# Patient Record
Sex: MALE | Race: WHITE
[De-identification: names, ages, dates, MRNs, and addresses within clinical notes are randomized per-mention and may not be internally consistent; named-entity substitution may affect disease eponyms.]

---

## 2017-06-10 NOTE — EDM.PDOC
ED HPI GENERAL MEDICAL PROBLEM





- General


Chief Complaint: Lower Extremity Injury/Pain


Stated Complaint: RIGHT KNEE INJURY


Time Seen by Provider: 06/10/17 01:17


Source of Information: Reports: Patient


History Limitations: Reports: No Limitations





- History of Present Illness


INITIAL COMMENTS - FREE TEXT/NARRATIVE: 





26-year-old male presents the ED with painful swollen right knee. No history of 

any trauma to the knee. He was on the golf course good portion of the day 

engulfed about 25 holes. Lots of walking up and down hills but no falls or 

direct blow to the knee reported. States at present the knees continued to 

swell up over the last 4-5 hours and he is no longer able to weight-bear. 

Reports a pre-existing clicking in the knee periodically for the last 10 years. 

It is never swollen up like this before. Constant throbbing pain.


Onset: Gradual


Onset Date: 06/09/17 (noted some discomfort in the knee last evening while 

golfing.)


Onset Time: 20:00


Duration: Hour(s):, Getting Worse


Location: Reports: Lower Extremity, Right (right knee.)


Quality: Reports: Ache, Pressure, Throbbing


Severity: Moderate


Improves with: Reports: None, Rest


Worsens with: Reports: Movement


Context: Denies: Activity, Exercise, Lifting, Sick Contact, Trauma, Other


Associated Symptoms: Denies: Diaphoresis, Fever/Chills, Headaches, Loss of 

Appetite, Malaise, Nausea/Vomiting, Rash, Seizure, Syncope, Weakness


Treatments PTA: Reports: Acetaminophen, NSAIDS (Motrin with no relief)


  ** Right Knee


Pain Score (Numeric/FACES): 4





- Related Data


 Allergies











Allergy/AdvReac Type Severity Reaction Status Date / Time


 


No Known Allergies Allergy   Verified 06/10/17 01:13











Home Meds: 


 Home Meds





Diclofenac Sodium [Voltaren] 50 mg PO TIDMEALS #24 tab.ec 06/10/17 [Rx]


oxyCODONE HCl/Acetaminophen [Percocet 5-325 mg Tablet] 1 - 2 each PO Q4H PRN #

20 tablet 06/10/17 [Rx]











Social & Family History





- Living Situation & Occupation


Living situation: Reports: Single


Occupation: Employed





Review of Systems





- Review of Systems


Review Of Systems: See Below


Constitutional: Reports: No Symptoms


Eyes: Reports: No Symptoms


Ears: Reports: No Symptoms


Nose: Reports: No Symptoms


Mouth/Throat: Reports: No Symptoms


Respiratory: Reports: No Symptoms


Cardiovascular: Reports: No Symptoms


GI/Abdominal: Reports: No Symptoms


Genitourinary: Reports: No Symptoms


Musculoskeletal: Reports: Joint Pain


Skin: Reports: No Symptoms


Neurological: Reports: No Symptoms


Psychiatric: Reports: No Symptoms





ED EXAM, GENERAL





- Physical Exam


Exam: See Below


Exam Limited By: No Limitations


General Appearance: Alert, WD/WN, No Apparent Distress, Other (patient admits 

to drinking beer good portion of all day yesterday. Last there was an hour ago. 

He is mildly intoxicated.)


Extremities: Other (examination is limited to his right knee. Obvious swelling 

of the patella the prepatellar tissue. There is no true knee effusion however. 

No pain along the lateral or medial joint space. It is exquisitely tender to 

touch and very warm to palpation over the kneecap. Can't tell exact platelets 

are prepatellar bursitis although I suspect it is mostly inflammation from the 

true knee joint under the patella.)


Neurological: Alert, Oriented, CN II-XII Intact


Psychiatric: Normal Affect, Normal Mood


Skin Exam: Warm, Dry, Intact, Normal Color, No Rash





ED JOINT ASPIRATION PROCEDURE





- Joint Apsiration/Arthrocentesis


Skin prep: Chlorhexidine (Hibiciens)


Local anesthesia: Lidocaine: 1% Plain


Local Anesthetic Volume: 4cc


Aspiration needle size: 18g


Aspirate appearance: serous


Aspirate amount in cc's: 12


Joint injection: other and amount: (Kenalog 60 mg)





Course





- Vital Signs


Last Recorded V/S: 


 Last Vital Signs











Temp  36.6 C   06/10/17 01:07


 


Pulse  84   06/10/17 01:07


 


Resp  20   06/10/17 01:07


 


BP  130/71   06/10/17 01:07


 


Pulse Ox  100   06/10/17 01:07














- Orders/Labs/Meds


Meds: 


Medications














Discontinued Medications














Generic Name Dose Route Start Last Admin





  Trade Name Jhonatan  PRN Reason Stop Dose Admin


 


Ondansetron HCl  4 mg  06/10/17 01:27  06/10/17 01:40





  Zofran Odt  PO  06/10/17 01:28  4 mg





  ONETIME ONE   Administration


 


Oxycodone/Acetaminophen  2 tab  06/10/17 01:26  06/10/17 01:40





  Percocet 325-5 Mg  PO  06/10/17 01:27  2 tab





  ONETIME ONE   Administration


 


Triamcinolone Acetonide  80 mg  06/10/17 01:42  06/10/17 01:49





  Kenalog-40  INJECT  06/10/17 01:43  60 mg





  ONETIME ONE   Administration














- Radiology Interpretation


Free Text/Narrative:: 





26-year-old male presents the ED with painful swollen right knee. No specific 

injury could be identified. He was coughing almost all day yesterday golfed 

about 26 holes walking a lot of pills [place]. States that he started to 

become somewhat painful last evening around 2000 hours and is progressively 

increased in size with and pain and swelling. Examination shows the swelling is 

all around the patella. There is no Rt. knee joint effusion released the 

prepatellar pouch does not appear to be pleural fluid. There is no fluid in the 

posterior aspect of the knee either. Plan x-ray of the knee will be done. Based 

on this a decision made whether to aspirate the area to reduce his pain and the 

swelling. Given 2 Percocets 5-25 mg orally as well Zofran 4 mg sublingual.





- Re-Assessments/Exams


Free Text/Narrative Re-Assessment/Exam: 





06/10/17 01:41 x-ray of the knee shows marked prepatellar bursitis. There is a 

little fluid under the patella at but otherwise patella is intact and in 

appropriate position. Plan is to aspirate the prepatellar bursa under local 

anesthetic and instilled 60 mg of Kenalog. Consent for the procedure will be 

obtained from the patient.





06/10/17 01:41 aspirin only 12 mils of serous material from the prepatellar 

bursa area. 60 mg of Kenalog was instilled into the area. Patient will be 

placed in the knee immobilizer be non weightbearing crutch walking until the 

swelling settles down. Followup if not markedly improved in 3-4 days' time. Did 

send him home with 5 Percocet 5 /325 mg tablets one or 2 of 4-6 hours needed 

for pain relie








Departure





- Departure


Time of Disposition: 02:03


Disposition: Home, Self-Care 01


Condition: fair


Clinical Impression: 


Bursitis


Qualifiers:


 Bursitis location: knee Knee bursitis location: prepatellar bursitis Laterality

: right Qualified Code(s): M70.41 - Prepatellar bursitis, right knee








- Discharge Information


Prescriptions: 


Diclofenac Sodium [Voltaren] 50 mg PO TIDMEALS #24 tab.ec


oxyCODONE HCl/Acetaminophen [Percocet 5-325 mg Tablet] 1 - 2 each PO Q4H PRN #

20 tablet


 PRN Reason: pain relief. 


Instructions:  Bursitis


Referrals: 


PCP,None [Primary Care Provider] - 


Forms:  ED Department Discharge, Return to Work/School Form


Additional Instructions: 


Evaluation in the emergency room this morning in regards to marked swelling of 

the right knee around the kneecap. The areas are very warm to palpate pain 

palpation suggesting underlying inflammatory process. No known trauma. It doesn'

t involve a lot of kneeling. X-ray of the knee was carried out and revealed the 

patella to be in its normal anatomical position and the fluid was in front of 

the kneecap indicating prepatellar bursitis. Aspirated 12 mils of fluid from 

the prepatellar bursa and instilled 60 mg of Kenalog into the bursa to try and 

reduce the swelling and inflammation. Likely cause is pseudogout or gout 

crystals. Medication will need to be Voltaren 50 mg 3 times daily for the next 

8 days to reduce inflammation. Percocet tablets 5-25 one or 2 of 4-6 hours for 

pain relief as needed for the next couple of days. Suggest knee immobilizer on 

during the day and off at night for the next 3 days until the swelling goes 

down and he can walk without need for crutches. Ice pack to the area still 

worthwhile for one half hour out of every 4 hours tomorrow.

## 2017-06-12 NOTE — CR
Right knee: AP, lateral and sunrise patellar views of the right knee 

were obtained.

 

Comparison: No previous study.

 

Soft tissue swelling is seen within the anterior knee.  Medial and 

lateral joint spaces are maintained in height.  No joint effusion is 

seen.  No fracture or other abnormality is seen.

 

Impression:

1.  Soft tissue swelling within the anterior knee.

2.  No bony abnormality is identified on right knee exam.

 

Diagnostic code #2

## 2021-02-18 ENCOUNTER — HOSPITAL ENCOUNTER (EMERGENCY)
Dept: HOSPITAL 41 - JD.ED | Age: 31
Discharge: HOME | End: 2021-02-18
Payer: COMMERCIAL

## 2021-02-18 VITALS — HEART RATE: 84 BPM | DIASTOLIC BLOOD PRESSURE: 113 MMHG | SYSTOLIC BLOOD PRESSURE: 133 MMHG

## 2021-02-18 DIAGNOSIS — Z72.0: ICD-10-CM
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
## 2021-02-18 DIAGNOSIS — S61.211A: Primary | ICD-10-CM

## 2021-02-18 NOTE — EDM.PDOC
ED HPI GENERAL MEDICAL PROBLEM





- General


Chief Complaint: Upper Extremity Injury/Pain


Stated Complaint: LEFT HAND FINGER LAC


Time Seen by Provider: 02/18/21 13:10


Source of Information: Reports: Patient


History Limitations: Reports: No Limitations





- History of Present Illness


INITIAL COMMENTS - FREE TEXT/NARRATIVE: 





30-year-old male presents the emergency department complaints of left hand 

injury.  He states he got his hand caught between the front of a flatbed truck 

in the back of the cab.  This injury occurred just prior to arrival.


  ** Left Finger-Index


Pain Score (Numeric/FACES): 5





- Related Data


                                    Allergies











Allergy/AdvReac Type Severity Reaction Status Date / Time


 


No Known Allergies Allergy   Verified 02/18/21 12:25














Past Medical History





- Past Health History


Medical/Surgical History: Denies Medical/Surgical History


Cardiovascular History: Reports: None


Respiratory History: Reports: None


Gastrointestinal History: Reports: None


Genitourinary History: Reports: None


Musculoskeletal History: Reports: None


Neurological History: Reports: None


Psychiatric History: Reports: None


Endocrine/Metabolic History: Reports: None


Hematologic History: Reports: None


Immunologic History: Reports: None


Oncologic (Cancer) History: Reports: None


Dermatologic History: Reports: None





- Infectious Disease History


Infectious Disease History: Reports: None





- Past Surgical History


Head Surgeries/Procedures: Reports: None


HEENT Surgical History: Reports: Tonsillectomy


Cardiovascular Surgical History: Reports: None


Endocrine Surgical History: Reports: None





Social & Family History





- Family History


Family Medical History: No Pertinent Family History





- Tobacco Use


Years of Tobacco use: 12





- Caffeine Use


Caffeine Use: Reports: Coffee





- Recreational Drug Use


Recreational Drug Use: No





- Living Situation & Occupation


Living situation: Reports: Single


Occupation: Employed





Review of Systems





- Review of Systems


Review Of Systems: Comprehensive ROS is negative, except as noted in HPI.





ED EXAM, GENERAL





- Physical Exam


Exam: See Below


Exam Limited By: No Limitations


General Appearance: Alert, WD/WN, No Apparent Distress


Eye Exam: Bilateral Eye: PERRL


Ears: Hearing Grossly Normal


Nose: Normal Inspection


Throat/Mouth: Normal Voice, No Airway Compromise


Head: Atraumatic, Normocephalic


Neck: Normal Inspection, Supple, Non-Tender, Full Range of Motion


Respiratory/Chest: No Respiratory Distress, Lungs Clear, Normal Breath Sounds, 

No Accessory Muscle Use, Chest Non-Tender


Cardiovascular: Normal Peripheral Pulses, Regular Rate, Rhythm, No Edema, No 

Murmur


Peripheral Pulses: 2+: Radial (L), Radial (R)


Extremities: Normal Inspection, Normal Range of Motion, No Pedal Edema


Neurological: Alert, Oriented, Normal Cognition


Skin Exam: Warm, Dry, Normal Color, Wound/Incision (1 cm laceration noted at the

base of the left index finger on the lateral side between the index and middle 

finger.)


Lymphatic: No Adenopathy





ED TRAUMA EXTREMITY PROCEDURES





- Laceration/Wound Repair


  ** Left Digit - 2nd (Index)


Lac/Wound Length In cm: 4


Appearance: Subcutaneous


Distal NVT: Neuro & Vascular Intact


Anesthetic Type: Local


Local Anesthesia - Lidocaine (Xylocaine): 1% Plain


Local Anesthetic Volume: Other (7)


Closed With: Sutures


Suture Size: 3-0


# of Sutures: 9


Suture Type: Nylon





Course





- Vital Signs


Text/Narrative:: 





30-year-old male presents with injury to his left index finger.  Sustained the 

injury while at work today just prior to arrival.  Got his hand caught between a

flatbed truck in a cab.  States he has numbness noted to his right index finger 

and there is also a laceration noted in the medial aspect of the base of the 

right index finger on the lateral side.  About a 1 cm laceration.  I have 

ordered a left hand x-ray.  Will need repair of this laceration so I have 

ordered 1% lidocaine.


Last Recorded V/S: 


                                Last Vital Signs











Temp  98.5 F   02/18/21 12:20


 


Pulse  84   02/18/21 12:20


 


Resp  16   02/18/21 12:20


 


BP  133/113 H  02/18/21 12:20


 


Pulse Ox  99   02/18/21 12:20














- Orders/Labs/Meds


Meds: 


Medications














Discontinued Medications














Generic Name Dose Route Start Last Admin





  Trade Name Jhonatan  PRN Reason Stop Dose Admin


 


Lidocaine HCl  10 ml  02/18/21 13:34  02/18/21 13:49





  Xylocaine 1%  INJECT  02/18/21 13:35  10 ml





  ONETIME ONE   Administration














- Re-Assessments/Exams


Free Text/Narrative Re-Assessment/Exam: 





02/18/21 14:28


Radiologist impression 4 view of the left hand: 1.  Nothing acute is appreciated

on left hand exam





Departure





- Departure


Time of Disposition: 14:30


Disposition: Home, Self-Care 01


Condition: Good


Clinical Impression: 


Laceration of index finger of left hand without complication


Qualifiers:


 Encounter type: initial encounter Qualified Code(s): S61.211A - Laceration 

without foreign body of left index finger without damage to nail, initial 

encounter








- Discharge Information


Instructions:  Laceration Care, Adult, Easy-to-Read


Referrals: 


PCP,None [Primary Care Provider] - 


Forms:  ED Department Discharge, ED Return to Work/School Form


Additional Instructions: 


He was seen in the emergency department today with an injury that was sustained 

at work just prior to arrival.  X-ray of the left hand specifically the index 

finger was unremarkable there are no fractures seen per the radiologist.  

Sutures were placed in your index finger.  These will need to come out in about 

10 days.  Keep the area clean and dry.  Use a mild soap such as Dial to wash 

twice daily and then pat dry.  You will need to wear some kind of a clean dry 

glove while at work.





Sepsis Event Note (ED)





- Evaluation


Sepsis Screening Result: No Definite Risk





- Focused Exam


Vital Signs: 


                                   Vital Signs











  Temp Pulse Resp BP Pulse Ox


 


 02/18/21 12:20  98.5 F  84  16  133/113 H  99

## 2021-02-18 NOTE — CR
Left hand: 4 views of the left hand were obtained.

 

Comparison: No prior hand study is available.

 

Joint spaces within the hand are maintained.  No acute fracture, 

dislocation or other bony abnormality is appreciated.

 

Impression:

1.  Nothing acute is appreciated on left hand exam.

 

Diagnostic code #1